# Patient Record
Sex: FEMALE | Race: WHITE | NOT HISPANIC OR LATINO | Employment: OTHER | URBAN - METROPOLITAN AREA
[De-identification: names, ages, dates, MRNs, and addresses within clinical notes are randomized per-mention and may not be internally consistent; named-entity substitution may affect disease eponyms.]

---

## 2017-02-01 ENCOUNTER — HOSPITAL ENCOUNTER (EMERGENCY)
Facility: HOSPITAL | Age: 57
Discharge: HOME | End: 2017-02-01
Attending: EMERGENCY MEDICINE | Admitting: EMERGENCY MEDICINE

## 2017-02-01 VITALS
RESPIRATION RATE: 18 BRPM | BODY MASS INDEX: 27.6 KG/M2 | HEIGHT: 62 IN | WEIGHT: 150 LBS | DIASTOLIC BLOOD PRESSURE: 79 MMHG | HEART RATE: 75 BPM | OXYGEN SATURATION: 99 % | TEMPERATURE: 98.7 F | SYSTOLIC BLOOD PRESSURE: 113 MMHG
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
## 2017-02-01 PROCEDURE — 90471 IMMUNIZATION ADMIN: CPT | Performed by: PHYSICIAN ASSISTANT

## 2017-02-01 PROCEDURE — 90715 TDAP VACCINE 7 YRS/> IM: CPT | Performed by: PHYSICIAN ASSISTANT

## 2017-02-01 PROCEDURE — 99283 EMERGENCY DEPT VISIT LOW MDM: CPT

## 2017-02-01 PROCEDURE — 25010000002 TDAP 5-2.5-18.5 LF-MCG/0.5 SUSPENSION: Performed by: PHYSICIAN ASSISTANT

## 2017-02-01 RX ORDER — AMOXICILLIN AND CLAVULANATE POTASSIUM 875; 125 MG/1; MG/1
1 TABLET, FILM COATED ORAL 2 TIMES DAILY
Qty: 20 TABLET | Refills: 0 | Status: SHIPPED | OUTPATIENT
Start: 2017-02-01

## 2017-02-01 RX ORDER — ESCITALOPRAM OXALATE 10 MG/1
TABLET ORAL DAILY
COMMUNITY

## 2017-02-01 RX ORDER — GABAPENTIN 600 MG/1
TABLET ORAL 3 TIMES DAILY
COMMUNITY

## 2017-02-01 RX ORDER — HYDROCODONE BITARTRATE AND ACETAMINOPHEN 5; 325 MG/1; MG/1
1 TABLET ORAL EVERY 6 HOURS PRN
Qty: 10 TABLET | Refills: 0 | Status: SHIPPED | OUTPATIENT
Start: 2017-02-01

## 2017-02-01 RX ORDER — OMEPRAZOLE 20 MG/1
CAPSULE, DELAYED RELEASE ORAL DAILY
COMMUNITY

## 2017-02-01 RX ADMIN — TETANUS TOXOID, REDUCED DIPHTHERIA TOXOID AND ACELLULAR PERTUSSIS VACCINE, ADSORBED 0.5 ML: 5; 2.5; 8; 8; 2.5 SUSPENSION INTRAMUSCULAR at 04:24

## 2017-02-01 NOTE — ED PROVIDER NOTES
EMERGENCY DEPARTMENT ENCOUNTER    CHIEF COMPLAINT  Chief Complaint: animal bite  History given by: patient  History limited by: nothing  Room Number: 07/07  PMD: No Known Provider      HPI:  Pt is a 56 y.o. female who presents c/o dog bite to her L forearm that occurred 2100 last night. Pt reports it was her dog and that it is up to date on its vaccinations. Pt reports pain when she moves her L wrist, but she denies any numbness, tingling, or loss of sensation. Pt states her tetanus is not up to date. There are no other complaints at this time.    Duration:  7 hours  Onset: sudden  Timing: constant  Location: L arm  Radiation: none  Quality: dog bite  Intensity/Severity: mild  Progression: unchanged  Associated Symptoms: pain with L wrist movement  Previous Episodes: none mentioned  Treatment before arrival: none    PAST MEDICAL HISTORY  Active Ambulatory Problems     Diagnosis Date Noted   • No Active Ambulatory Problems     Resolved Ambulatory Problems     Diagnosis Date Noted   • No Resolved Ambulatory Problems     Past Medical History   Diagnosis Date   • Depression        PAST SURGICAL HISTORY  History reviewed. No pertinent past surgical history.    FAMILY HISTORY  History reviewed. No pertinent family history.    SOCIAL HISTORY  Social History     Social History   • Marital status:      Spouse name: N/A   • Number of children: N/A   • Years of education: N/A     Occupational History   • Not on file.     Social History Main Topics   • Smoking status: Never Smoker   • Smokeless tobacco: Not on file   • Alcohol use No   • Drug use: No   • Sexual activity: Not on file     Other Topics Concern   • Not on file     Social History Narrative   • No narrative on file       ALLERGIES  Sulfa antibiotics    REVIEW OF SYSTEMS  Review of Systems   Cardiovascular: Negative for chest pain.   Musculoskeletal: Positive for arthralgias (L wrist pain with movement).   Skin: Positive for wound (bite wound to L forearm ).        PHYSICAL EXAM  ED Triage Vitals   Temp Heart Rate Resp BP SpO2   02/01/17 0130 02/01/17 0130 02/01/17 0130 02/01/17 0131 02/01/17 0130   96.7 °F (35.9 °C) 100 16 146/93 100 %      Temp src Heart Rate Source Patient Position BP Location FiO2 (%)   02/01/17 0130 02/01/17 0130 -- -- --   Tympanic Monitor          Physical Exam   Constitutional: She is oriented to person, place, and time and well-developed, well-nourished, and in no distress.   Cardiovascular: Normal rate and regular rhythm.    Good cap refill.    Pulmonary/Chest: Effort normal and breath sounds normal. No respiratory distress.   Musculoskeletal:   3 puncture wounds to the proximal forearm just distal to the elbow. Pain with flexion and extension of L wrist. No deficits. Neurovascularly intact distal to wound.    Neurological: She is alert and oriented to person, place, and time. She has normal sensation and normal strength.   Skin: Skin is warm and dry.   Psychiatric: Affect normal.   Nursing note and vitals reviewed.      PROCEDURES  Procedures      PROGRESS AND CONSULTS  ED Course     4:07 AM:  Reviewed pt's history and workup with Dr. Delaney who, after bedside evaluation, agrees with the plan of care.    4:08 AM: Ordered Tdap injection for tetanus prevention    4:15 AM: Rechecked pt. Notified pt that placing sutures would increased risks of infection. Clean wound with betadine, irrigated with saline, and covered with Band-Aids. D/w pt plan for discharge with L wrist splint, Augmentin, Norco, and PMD follow up. Pt understands and agrees with the plan, all questions answered.     MEDICAL DECISION MAKING    MDM  Number of Diagnoses or Management Options     Amount and/or Complexity of Data Reviewed  Decide to obtain previous medical records or to obtain history from someone other than the patient: yes (No previous visits to [facility] available in [facility])  Discuss the patient with other providers: yes (D/w Dr. Delaney)           DIAGNOSIS  Final  diagnoses:   Dog bite, initial encounter       DISPOSITION  DISCHARGE    Patient discharged in stable condition.    Reviewed implications of results, diagnosis, meds, responsibility to follow up, warning signs and symptoms of possible worsening, potential complications and reasons to return to ER.    Patient/Family voiced understanding of above instructions.    Discussed plan for discharge, as there is no emergent indication for admission.  Pt/family is agreeable and understands need for follow up and repeat testing.  Pt is aware that discharge does not mean that nothing is wrong but it indicates no emergency is present that requires admission and they must continue care with follow-up as given below or physician of their choice.     FOLLOW-UP  [facility] REFERRAL SERVICE  [facility] [address] [phone]    call to set up appointment with a primary care provider         Medication List      New Prescriptions          amoxicillin-clavulanate 875-125 MG per tablet   Commonly known as:  AUGMENTIN   Take 1 tablet by mouth 2 (Two) Times a Day.       HYDROcodone-acetaminophen 5-325 MG per tablet   Commonly known as:  NORCO   Take 1 tablet by mouth Every 6 (Six) Hours As Needed for severe pain   (7-10).             Latest Documented Vital Signs:  As of 4:50 AM  BP- 146/93 HR- 100 Temp- 96.7 °F (35.9 °C) (Tympanic) O2 sat- 100%    --  Documentation assistance provided by cyndy Gannon for Titus Elliott PA-C. Information recorded by the scribe was done at my direction and has been verified and validated by me.     Jerrod Gannon  02/01/17 0425       LEORA Pabon III  02/01/17 0451

## 2017-02-01 NOTE — ED PROVIDER NOTES
55 y/o female presents to the ED with puncture wounds to her left forearm after getting bit by her dog. The pt states she her dog is UTD on shots.     EXAM:  3 puncture wounds to the proximal left forearm.   NV intact distally  Intact radial pulses.  Pain with ROM of LUE.     PLAN:  Wounds will be cleaned and irrigated extensively, wounds will not be sutured. Ordered TDAP. Pt will be d/c with Abx for infection, Lortab for pain, and splint.     I supervised care provided by the midlevel provider.    We have discussed this patient's history, physical exam, and treatment plan.   I have reviewed the note and personally saw and examined the patient and agree with the plan of care.    Entered by Pillo Gaffney acting as scribe for Dr. Elaina Gaffney  02/01/17 0418           Pillo Gaffney  02/01/17 0422       Leopoldo Delaney MD  02/01/17 [id number]

## 2017-02-01 NOTE — ED NOTES
Dog bite to left forearm by owners own pet, pt states up to date on shots, happened at 2100 last night     Jaja Carter RN  02/01/17 0120